# Patient Record
Sex: MALE | Race: WHITE | Employment: OTHER | URBAN - METROPOLITAN AREA
[De-identification: names, ages, dates, MRNs, and addresses within clinical notes are randomized per-mention and may not be internally consistent; named-entity substitution may affect disease eponyms.]

---

## 2020-11-19 LAB
CREATININE, EXTERNAL: 0.9
HBA1C MFR BLD HPLC: 5.4 %
PSA, EXTERNAL: 0.53

## 2021-02-08 ENCOUNTER — OFFICE VISIT (OUTPATIENT)
Dept: ENDOCRINOLOGY | Age: 51
End: 2021-02-08
Payer: OTHER GOVERNMENT

## 2021-02-08 VITALS
RESPIRATION RATE: 18 BRPM | OXYGEN SATURATION: 99 % | DIASTOLIC BLOOD PRESSURE: 82 MMHG | BODY MASS INDEX: 26.68 KG/M2 | HEIGHT: 66 IN | WEIGHT: 166 LBS | TEMPERATURE: 97.1 F | SYSTOLIC BLOOD PRESSURE: 138 MMHG | HEART RATE: 63 BPM

## 2021-02-08 DIAGNOSIS — E78.1 HYPERTRIGLYCERIDEMIA: ICD-10-CM

## 2021-02-08 DIAGNOSIS — E03.4 HYPOTHYROIDISM DUE TO ACQUIRED ATROPHY OF THYROID: Primary | ICD-10-CM

## 2021-02-08 PROCEDURE — 99244 OFF/OP CNSLTJ NEW/EST MOD 40: CPT | Performed by: INTERNAL MEDICINE

## 2021-02-08 RX ORDER — ATORVASTATIN CALCIUM 10 MG/1
TABLET, FILM COATED ORAL DAILY
COMMUNITY

## 2021-02-08 RX ORDER — ASPIRIN 81 MG/1
81 TABLET ORAL 2 TIMES DAILY
COMMUNITY

## 2021-02-08 NOTE — PATIENT INSTRUCTIONS
SPECIFIC INSTRUCTIONS BELOW  
 
VASCEPA  - look up  On google (  For high triglycerides )  
  
Cholesterol and Triglycerides Tests: About These Tests 
What are they? 
  
Cholesterol and triglycerides tests measure the amount of fats in your blood. These fats have both \"good\" (HDL) and \"bad\" (LDL) cholesterol. 
Why are these tests done? 
These tests are done to help find out your risk of a heart attack and stroke. Your doctor uses your cholesterol levels plus other things such as blood pressure, age, and sex to calculate your risk. These tests also help your doctor find out how well medicine is working for some health problems. 
How do you prepare for these tests? 
· Your doctor may tell you to fast before your tests. This means that you do not eat or drink anything except water for 9 to 12 hours before the tests. In most cases, you can take your medicines with water the morning of the test. 
· Do not eat high-fat foods the night before the tests. 
· Do not drink alcohol or do intense exercise the night before the tests. 
· Tell your doctor ALL the medicines, vitamins, supplements, and herbal remedies you take. Some may increase the risk of problems during your test. Your doctor will tell you if you should stop taking any of them before the test and how soon to do it. 
How are these tests done? 
A health professional uses a needle to take a blood sample, usually from the arm. 
Where can you learn more? 
Go to https://www.Revolights.net/RanberrypConnections 
Enter V788 in the search box to learn more about \"Cholesterol and Triglycerides Tests: About These Tests.\" 
Current as of: December 16, 2019               Content Version: 12.6 
© 5691-8580 Healthwise, Incorporated.  
Care instructions adapted under license by [employer]ever (which disclaims liability or warranty for this information). If you have questions about a medical condition or this instruction, always ask your healthcare professional. [employer],  Incorporated disclaims any warranty or liability for your use of this information. PAY ATTENTION TO THESE GENERAL INSTRUCTIONS  
 
- HEALTH MAINTENANCE IS NOT GOING TO BE UP TO DATE ON YOUR AVS- PLEASE IGNORE  
- YOUR MED LIST IS NOT UP TO DATE AS SOME CHANGES ARE BEING MADE AFTER THE VISIT - FOLLOW SPECIFIC INSTRUCTIONS ABOVE Results *Normal results will not be notified by a phone call starting January 1 2021 *If you have an upcoming visit, the results will be discussed at the visit *Please sign up for MY CHART if you want access to your lab and test results *Abnormal results which require immediate attention will be notified by phone call *Abnormal results which do not require immediate assistance will be notified in 1-2 weeks Refills    -    have your pharmacy send us a refill request 
Phone calls  -  Allow  24 hrs. for non-urgent calls to be returned Prior authorization - It may take 2-4 weeks to process Forms  -  FMLA, DMV etc., will take up to 2 weeks to process Cancellations - please notify the office 2 days in advance Samples  - will only be dispensed at visits  
 
--------------------------------------------------------------------------------------------

## 2021-02-08 NOTE — PROGRESS NOTES
HISTORY OF PRESENT ILLNESS  Aida Colin is a 48 y.o. male. HPI  Initial visit for thyroid   He is  of Ms.  Florencia Beard, my patient     Patient had  Astrocytoma of brain, got treated , cancer free now   He was found to have elevated TSH on recent lab done at [facility] by pcp     It was 4.9 in Nov 2020   He denies any symptoms of hypothyroidism         Past Medical History:   Diagnosis Date    Anxiety disorder     Blindness - both eyes     to the right in both eyes vision gone    Brain tumor, astrocytoma (HCC)     grade 2    Fatigue     Snoring        Social History     Socioeconomic History    Marital status:      Spouse name: Not on file    Number of children: Not on file    Years of education: Not on file    Highest education level: Not on file   Occupational History    Not on file   Social Needs    Financial resource strain: Not on file    Food insecurity     Worry: Not on file     Inability: Not on file    Transportation needs     Medical: Not on file     Non-medical: Not on file   Tobacco Use    Smoking status: Never Smoker    Smokeless tobacco: Never Used   Substance and Sexual Activity    Alcohol use: No    Drug use: Not on file    Sexual activity: Not on file   Lifestyle    Physical activity     Days per week: Not on file     Minutes per session: Not on file    Stress: Not on file   Relationships    Social connections     Talks on phone: Not on file     Gets together: Not on file     Attends Cheondoism service: Not on file     Active member of club or organization: Not on file     Attends meetings of clubs or organizations: Not on file     Relationship status: Not on file    Intimate partner violence     Fear of current or ex partner: Not on file     Emotionally abused: Not on file     Physically abused: Not on file     Forced sexual activity: Not on file   Other Topics Concern    Not on file   Social History Narrative    Not on file       Family History   Problem Relation Age of Onset    Dementia Maternal Grandmother     Dementia Maternal Grandfather     Cancer Mother         breast    Parkinsonism Father              Review of Systems   Constitutional: Negative. HENT: Negative. Eyes: Negative for pain and redness. Respiratory: Negative. Cardiovascular: Negative for chest pain, palpitations and leg swelling. Gastrointestinal: Negative. Negative for constipation. Genitourinary: Negative. Musculoskeletal: Negative for myalgias. Skin: Negative. Neurological: Negative. Endo/Heme/Allergies: Negative. Psychiatric/Behavioral: Negative for depression and memory loss. The patient does not have insomnia. Physical Exam  Constitutional:       Appearance: He is well-developed. Neck:      Musculoskeletal: Neck supple. Cardiovascular:      Rate and Rhythm: Normal rate. Pulmonary:      Effort: Pulmonary effort is normal.   Abdominal:      Palpations: Abdomen is soft. Musculoskeletal: Normal range of motion. Neurological:      Mental Status: He is alert and oriented to person, place, and time. Deep Tendon Reflexes: Reflexes are normal and symmetric. ASSESSMENT and PLAN    1. SUB-CLINICAL Hypothyroidism - TSH 4.9 from Nov 2020 at [facility]   Pt is asymptomatic, will order labs and will start on replacement if TSH is abnormal   More than often, could be slight variation of TSH in Nov 2020 , can revert to normal   Discussed the importance of euthyroid state       2. H/o  Brain malignant tumor - 2012 (Astrocytoma)  He presented with seizures   He got treated there at [address] [facility] and then got treated for it - he had surgical treatment and cranial irradiation   He follows up at [facility] and he has been cancer free       3.  Hypertriglyceridemia  -  Discussed food/ genetics    Start on vascepa -  If okay   Could be from  #1  If the TSH is over 15 , otherwise, related to genetics

## 2021-02-08 NOTE — LETTER
2/11/2021 Patient: Latonya Leong YOB: 1970 Date of Visit: 2/8/2021 Khadra Espinoza NP [facility] [address] [place] [address] Via Fax: [phone] Dear Fayne Battle, NP Darlina Snellen. PAT Miller, Thank you for referring Mr. Kay Gamble to [address] [place] [address] for evaluation. My notes for this consultation are attached. If you have questions, please do not hesitate to call me. I look forward to following your patient along with you. Sincerely, Brendon Joy MD

## 2021-02-08 NOTE — PROGRESS NOTES
1. Have you been to the ER, urgent care clinic since your last visit?no  Hospitalized since your last visit?no    2. Have you seen or consulted any other health care providers outside of the [address] [place] [address] since your last visit? Include any pap smears or colon screening.  [facility]-Neurologist-December, 2020    Wt Readings from Last 3 Encounters:   02/08/21 166 lb (75.3 kg)   11/19/14 168 lb (76.2 kg)   08/27/14 168 lb (76.2 kg)     Temp Readings from Last 3 Encounters:   02/08/21 97.1 °F (36.2 °C) (Oral)     BP Readings from Last 3 Encounters:   02/08/21 138/82   11/19/14 102/74   08/27/14 108/72     Pulse Readings from Last 3 Encounters:   02/08/21 63   11/19/14 (!) 53   08/27/14 60

## 2021-02-09 LAB
T4 FREE SERPL-MCNC: 0.9 NG/DL (ref 0.8–1.5)
THYROGLOB AB SERPL-ACNC: <1 IU/ML (ref 0–0.9)
THYROPEROXIDASE AB SERPL-ACNC: <9 IU/ML (ref 0–34)
TSH SERPL DL<=0.05 MIU/L-ACNC: 2.88 UIU/ML (ref 0.36–3.74)

## 2021-04-14 ENCOUNTER — OFFICE VISIT (OUTPATIENT)
Dept: ENDOCRINOLOGY | Age: 51
End: 2021-04-14
Payer: OTHER GOVERNMENT

## 2021-04-14 VITALS
WEIGHT: 163.6 LBS | HEIGHT: 66 IN | OXYGEN SATURATION: 99 % | DIASTOLIC BLOOD PRESSURE: 80 MMHG | HEART RATE: 60 BPM | TEMPERATURE: 97.6 F | SYSTOLIC BLOOD PRESSURE: 116 MMHG | RESPIRATION RATE: 20 BRPM | BODY MASS INDEX: 26.29 KG/M2

## 2021-04-14 DIAGNOSIS — E78.1 HYPERTRIGLYCERIDEMIA: ICD-10-CM

## 2021-04-14 DIAGNOSIS — E03.4 HYPOTHYROIDISM DUE TO ACQUIRED ATROPHY OF THYROID: Primary | ICD-10-CM

## 2021-04-14 PROCEDURE — 99214 OFFICE O/P EST MOD 30 MIN: CPT | Performed by: INTERNAL MEDICINE

## 2021-04-14 NOTE — PROGRESS NOTES
HISTORY OF PRESENT ILLNESS  Gil Molina is a 46 y.o. male. First visit after   Initial visit for possible  thyroid  Abnormality  from feb 2021     He is not on any thyroid meds as he was found to have normal labs   He has no new  Medical issues       Feb 2021    He is  of Ms. Julio Maguire, my patient   Patient had  Astrocytoma of brain, got treated , cancer free now   He was found to have elevated TSH on recent lab done at [facility] by pcp   It was 4.9 in Nov 2020   He denies any symptoms of hypothyroidism         Past Medical History:   Diagnosis Date    Anxiety disorder     Blindness - both eyes     to the right in both eyes vision gone    Brain tumor, astrocytoma ([address]     grade 2    Fatigue     Snoring        Review of Systems   Low vision       Physical Exam   Constitutional: She is oriented to person, place, and time. She appears well-developed and well-nourished. Psychiatric: She has a normal mood and affect. Lab Results   Component Value Date/Time    TSH 2.88 02/08/2021 12:07 PM    T4, Free 0.9 02/08/2021 12:07 PM        ASSESSMENT and PLAN    1. SUB-CLINICAL Hypothyroidism - TSH 4.9 from Nov 2020 at [facility]   Pt is asymptomatic,  ordered labs and considered  Starting  on replacement,  if TSH is abnormal   The  labs  Feb 2021 - euthyrodi - no meds were started     2. H/o  Brain malignant tumor - 2012 (Astrocytoma)  He presented with seizures   He got treated there at [address] [facility] and then got treated for it  [facility] he had surgical treatment and cranial irradiation   He follows up at [facility] and he has been cancer free       3.  Hypertriglyceridemia  -  Discussed food/ genetics    Start on vascepa -  If okay -  related to genetics       I did not give any f/u appt with me as TFTs are normal

## 2021-04-14 NOTE — PATIENT INSTRUCTIONS
SPECIFIC INSTRUCTIONS BELOW  
 
 
VASCEPA  - look up  On [employer] (  For high triglycerides ) Cholesterol and Triglycerides Tests: About These Tests What are they? Cholesterol and triglycerides tests measure the amount of fats in your blood. These fats have both \"good\" (HDL) and \"bad\" (LDL) cholesterol. Why are these tests done? These tests are done to help find out your risk of a heart attack and stroke. Your doctor uses your cholesterol levels plus other things such as blood pressure, age, and sex to calculate your risk. These tests also help your doctor find out how well medicine is working for some health problems. How do you prepare for these tests? · Your doctor may tell you to fast before your tests. This means that you do not eat or drink anything except water for 9 to 12 hours before the tests. In most cases, you can take your medicines with water the morning of the test. 
· Do not eat high-fat foods the night before the tests. · Do not drink alcohol or do intense exercise the night before the tests. · Tell your doctor ALL the medicines, vitamins, supplements, and herbal remedies you take. Some may increase the risk of problems during your test. Your doctor will tell you if you should stop taking any of them before the test and how soon to do it. How are these tests done? A health professional uses a needle to take a blood sample, usually from the arm. Where can you learn more? Go to http://www.gray.com/ Enter M640 in the search box to learn more about \"Cholesterol and Triglycerides Tests: About These Tests. \" Current as of: December 16, 2019               Content Version: 12.6 © 1285-8205 [employer], Incorporated. Care instructions adapted under license by boarding pass (which disclaims liability or warranty for this information).  If you have questions about a medical condition or this instruction, always ask your healthcare professional. [email or web address], [employer] disclaims any warranty or liability for your use of this information. PAY ATTENTION TO THESE GENERAL INSTRUCTIONS  
 
- HEALTH MAINTENANCE IS NOT GOING TO BE UP TO DATE ON YOUR AVS- PLEASE IGNORE  
- YOUR MED LIST IS NOT UP TO DATE AS SOME CHANGES ARE BEING MADE AFTER THE VISIT - FOLLOW SPECIFIC INSTRUCTIONS ABOVE Results *Normal results will not be notified by a phone call starting January 1 2021 *If you have an upcoming visit, the results will be discussed at the visit *Please sign up for MY CHART if you want access to your lab and test results *Abnormal results which require immediate attention will be notified by phone call *Abnormal results which do not require immediate assistance will be notified in 1-2 weeks Refills    -    have your pharmacy send us a refill request 
Phone calls  -  Allow  24 hrs. for non-urgent calls to be returned Prior authorization - It may take 2-4 weeks to process Forms  -  FMLA, DMV etc., will take up to 2 weeks to process Cancellations - please notify the office 2 days in advance Samples  - will only be dispensed at visits -

## 2021-04-14 NOTE — PROGRESS NOTES
1. Have you been to the ER, urgent care clinic since your last visit? No  Hospitalized since your last visit? No    2. Have you seen or consulted any other health care providers outside of the [address] [place] [address] since your last visit? Include any pap smears or colon screening.  No    Wt Readings from Last 3 Encounters:   04/14/21 163 lb 9.6 oz (74.2 kg)   02/08/21 166 lb (75.3 kg)   11/19/14 168 lb (76.2 kg)     Temp Readings from Last 3 Encounters:   04/14/21 97.6 °F (36.4 °C) (Oral)   02/08/21 97.1 °F (36.2 °C) (Oral)     BP Readings from Last 3 Encounters:   04/14/21 116/80   02/08/21 138/82   11/19/14 102/74     Pulse Readings from Last 3 Encounters:   04/14/21 60   02/08/21 63   11/19/14 (!) 53

## 2021-04-14 NOTE — Clinical Note
4/14/2021 Patient: Edil Soria YOB: 1970 Date of Visit: 4/14/2021 Akosua Hill NP Via Dear Akosua Hill NP, Thank you for referring Mr. Raegan Raza to [address] [place] [address] for evaluation. My notes for this consultation are attached. If you have questions, please do not hesitate to call me. I look forward to following your patient along with you. Sincerely, Jason Durant MD

## 2021-04-23 ENCOUNTER — LAB ONLY (OUTPATIENT)
Dept: ENDOCRINOLOGY | Age: 51
End: 2021-04-23

## 2021-04-23 DIAGNOSIS — E78.1 HYPERTRIGLYCERIDEMIA: ICD-10-CM

## 2021-04-23 DIAGNOSIS — E03.4 HYPOTHYROIDISM DUE TO ACQUIRED ATROPHY OF THYROID: ICD-10-CM

## 2021-04-23 LAB
CHOLEST SERPL-MCNC: 179 MG/DL
HDLC SERPL-MCNC: 40 MG/DL
HDLC SERPL: 4.5 {RATIO} (ref 0–5)
LDLC SERPL CALC-MCNC: 93.4 MG/DL (ref 0–100)
LIPID PROFILE,FLP: ABNORMAL
TRIGL SERPL-MCNC: 228 MG/DL (ref ?–150)
TSH SERPL DL<=0.05 MIU/L-ACNC: 2.68 UIU/ML (ref 0.36–3.74)
VLDLC SERPL CALC-MCNC: 45.6 MG/DL

## 2023-05-23 RX ORDER — ACETAMINOPHEN 500 MG
TABLET ORAL EVERY 6 HOURS PRN
COMMUNITY

## 2023-05-23 RX ORDER — ASPIRIN 81 MG/1
81 TABLET ORAL 2 TIMES DAILY
COMMUNITY

## 2023-05-23 RX ORDER — ATORVASTATIN CALCIUM 10 MG/1
TABLET, FILM COATED ORAL DAILY
COMMUNITY

## 2023-05-23 RX ORDER — IBUPROFEN 200 MG
200 TABLET ORAL EVERY 8 HOURS PRN
COMMUNITY

## 2023-05-23 RX ORDER — LEVETIRACETAM 1000 MG/1
1000 TABLET ORAL 2 TIMES DAILY
COMMUNITY
Start: 2014-11-19